# Patient Record
Sex: FEMALE | Race: WHITE | NOT HISPANIC OR LATINO | URBAN - NONMETROPOLITAN AREA
[De-identification: names, ages, dates, MRNs, and addresses within clinical notes are randomized per-mention and may not be internally consistent; named-entity substitution may affect disease eponyms.]

---

## 2021-08-29 PROBLEM — R00.2 PALPITATIONS: Status: ACTIVE | Noted: 2021-08-29

## 2021-08-29 PROBLEM — R05.9 COUGH: Status: ACTIVE | Noted: 2021-08-29

## 2021-08-29 PROBLEM — R06.09 DYSPNEA ON EXERTION: Status: ACTIVE | Noted: 2021-08-29

## 2022-04-10 PROBLEM — R42 VERTIGO: Status: ACTIVE | Noted: 2022-04-10

## 2023-04-18 ENCOUNTER — OFFICE VISIT (OUTPATIENT)
Dept: CARDIOLOGY | Facility: CLINIC | Age: 43
End: 2023-04-18
Payer: COMMERCIAL

## 2023-04-18 VITALS
DIASTOLIC BLOOD PRESSURE: 60 MMHG | WEIGHT: 172 LBS | BODY MASS INDEX: 27.64 KG/M2 | SYSTOLIC BLOOD PRESSURE: 102 MMHG | OXYGEN SATURATION: 98 % | HEART RATE: 60 BPM | HEIGHT: 66 IN

## 2023-04-18 DIAGNOSIS — R00.2 PALPITATIONS: Primary | ICD-10-CM

## 2023-04-18 DIAGNOSIS — R42 VERTIGO: ICD-10-CM

## 2023-04-18 RX ORDER — METOPROLOL SUCCINATE 25 MG/1
25 TABLET, EXTENDED RELEASE ORAL DAILY
Qty: 90 TABLET | Refills: 3 | Status: SHIPPED | OUTPATIENT
Start: 2023-04-18

## 2023-04-18 NOTE — PROGRESS NOTES
"     Subjective:     Encounter Date:04/18/2023      Patient ID: Estela Roberts is a 42 y.o. female.    Chief Complaint:  History of Present Illness    The patient presents today for annual followup.    The patient reports that she has done well over the past 1 year. She notes rare \"flutters,\" which are not associated with any particular activity. She takes Toprol-XL, which is effective for her. The patient denies chest pain, dyspnea, and syncope.    Her dizziness and lightheadedness resolved. She was prescribed as-needed Antivert, which she has not required for a lengthy period of time.    She changed jobs, which is has reduced stress.        Review of Systems   Constitutional: Negative for diaphoresis, fever and malaise/fatigue.   HENT: Negative for congestion.    Eyes: Negative for vision loss in left eye and vision loss in right eye.   Cardiovascular: Positive for palpitations. Negative for chest pain, claudication, dyspnea on exertion, irregular heartbeat, leg swelling, orthopnea and syncope.   Respiratory: Negative for cough, shortness of breath and wheezing.    Hematologic/Lymphatic: Negative for adenopathy.   Skin: Negative for rash.   Musculoskeletal: Negative for joint pain and joint swelling.   Gastrointestinal: Negative for abdominal pain, diarrhea, nausea and vomiting.   Neurological: Negative for excessive daytime sleepiness, dizziness, focal weakness, light-headedness, numbness and weakness.   Psychiatric/Behavioral: Negative for depression. The patient does not have insomnia.      All negative except as noted in the HPI.      Current Outpatient Medications:   •  lamoTRIgine (LaMICtal) 200 MG tablet, Take 1 tablet by mouth 2 (Two) Times a Day., Disp: , Rfl:   •  metoprolol succinate XL (TOPROL-XL) 25 MG 24 hr tablet, Take 1 tablet by mouth Daily., Disp: 90 tablet, Rfl: 3       Objective:      Vitals:    04/18/23 0939   BP: 102/60   Pulse: 60   SpO2: 98%     Vitals and nursing note reviewed. "   Constitutional:       Appearance: Normal and healthy appearance. Well-developed and not in distress.   Eyes:      Extraocular Movements: Extraocular movements intact.      Pupils: Pupils are equal, round, and reactive to light.   HENT:      Head: Normocephalic and atraumatic.    Mouth/Throat:      Pharynx: Oropharynx is clear.   Neck:      Vascular: JVD normal.      Trachea: Trachea normal.   Pulmonary:      Effort: Pulmonary effort is normal.      Breath sounds: Normal breath sounds. No wheezing. No rhonchi. No rales.   Cardiovascular:      PMI at left midclavicular line. Normal rate. Regular rhythm. Normal S1. Normal S2.      Murmurs: There is no murmur.      No gallop. No rub.   Pulses:     Dorsalis pedis: 2+ bilaterally.     Posterior tibial: 2+ bilaterally.  Abdominal:      General: Bowel sounds are normal.      Palpations: Abdomen is soft.      Tenderness: There is no abdominal tenderness.   Musculoskeletal: Normal range of motion.      Cervical back: Normal range of motion and neck supple. Skin:     General: Skin is warm and dry.      Capillary Refill: Capillary refill takes less than 2 seconds.   Feet:      Right foot:      Skin integrity: Skin integrity normal.      Left foot:      Skin integrity: Skin integrity normal.   Neurological:      Mental Status: Alert and oriented to person, place and time.      Cranial Nerves: Cranial nerves are intact.      Sensory: Sensation is intact.      Motor: Motor function is intact.      Coordination: Coordination is intact.   Psychiatric:         Speech: Speech normal.         Behavior: Behavior is cooperative.         Lab Review:         ECG 12 Lead    Date/Time: 4/24/2023 9:38 AM  Performed by: López Hurst DO  Authorized by: López Hurst DO   Comparison: compared with previous ECG   Similar to previous ECG  Rhythm: sinus rhythm  Rate: normal  Conduction: conduction normal  QRS axis: normal  Other findings: T wave abnormality    Clinical  impression: abnormal EKG              Assessment/Plan:     Problem List Items Addressed This Visit        Cardiac and Vasculature    Palpitations - Primary       ENT    Vertigo     1. Palpitations    Plan  Toprol-XL was refilled. The patient will follow up in 1 year or sooner as needed for new or worsening symptoms.    Recommendations/plans:    Transcribed from ambient dictation for López Hurst DO by Liz Ferreira.  04/18/23   12:43 CDT    Patient or patient representative verbalized consent to the visit recording.  I have personally performed the services described in this document as transcribed by the above individual, and it is both accurate and complete.  López Hurst DO  4/24/2023  09:38 CDT

## 2024-03-15 RX ORDER — METOPROLOL SUCCINATE 25 MG/1
25 TABLET, EXTENDED RELEASE ORAL DAILY
Qty: 90 TABLET | Refills: 0 | Status: SHIPPED | OUTPATIENT
Start: 2024-03-15

## 2024-06-26 ENCOUNTER — OFFICE VISIT (OUTPATIENT)
Dept: CARDIOLOGY | Facility: CLINIC | Age: 44
End: 2024-06-26
Payer: COMMERCIAL

## 2024-06-26 VITALS
BODY MASS INDEX: 25.99 KG/M2 | DIASTOLIC BLOOD PRESSURE: 70 MMHG | HEART RATE: 52 BPM | SYSTOLIC BLOOD PRESSURE: 115 MMHG | OXYGEN SATURATION: 98 % | WEIGHT: 156 LBS | HEIGHT: 65 IN | RESPIRATION RATE: 18 BRPM

## 2024-06-26 DIAGNOSIS — R00.2 PALPITATIONS: Primary | ICD-10-CM

## 2024-06-26 DIAGNOSIS — R00.1 BRADYCARDIA, SINUS: ICD-10-CM

## 2024-06-26 PROCEDURE — 93000 ELECTROCARDIOGRAM COMPLETE: CPT | Performed by: NURSE PRACTITIONER

## 2024-06-26 PROCEDURE — 99214 OFFICE O/P EST MOD 30 MIN: CPT | Performed by: NURSE PRACTITIONER

## 2024-06-26 RX ORDER — TOPIRAMATE 25 MG/1
1 TABLET ORAL
COMMUNITY

## 2024-06-26 NOTE — PROGRESS NOTES
Subjective:     Encounter Date:06/26/2024      Patient ID: Estela Roberts is a 43 y.o. female     Chief Complaint:  History of Present Illness  Patient presents today for routine cardiology follow up. Patient is followed for palpitations. Patient was noted to have rare PACs and PVCs. She is on Toprol. She has GERD, Palpitations and Depression. She follows with Delores GORE as her PCP. She notes that her palpitations were occurring during stress at work. She notes she no longer works in this stressful situation. She notes her palpitations have resolved. She is asking if she has to stay on Toprol.     The following portions of the patient's history were reviewed and updated as appropriate: allergies, current medications, past medical history, past social history, past and problem list.    Allergies   Allergen Reactions    Demerol [Meperidine] Unknown - High Severity       Current Outpatient Medications:     lamoTRIgine (LaMICtal) 200 MG tablet, Take 1 tablet by mouth 2 (Two) Times a Day., Disp: , Rfl:     metoprolol succinate XL (TOPROL-XL) 25 MG 24 hr tablet, Take 1 tablet by mouth Daily., Disp: 90 tablet, Rfl: 0    topiramate (TOPAMAX) 25 MG tablet, Take 1 tablet by mouth every night at bedtime., Disp: , Rfl:     Social History     Socioeconomic History    Marital status:    Tobacco Use    Smoking status: Former     Types: Cigarettes    Smokeless tobacco: Never   Vaping Use    Vaping status: Never Used   Substance and Sexual Activity    Alcohol use: Yes    Drug use: Never    Sexual activity: Defer       Review of Systems   Constitutional: Negative for chills, decreased appetite, fever, malaise/fatigue, weight gain and weight loss.   HENT:  Negative for nosebleeds.    Eyes:  Negative for visual disturbance.   Cardiovascular:  Negative for chest pain, dyspnea on exertion, leg swelling, near-syncope, orthopnea, palpitations, paroxysmal nocturnal dyspnea and syncope.   Respiratory:  Negative for cough,  hemoptysis, shortness of breath and snoring.    Endocrine: Negative for cold intolerance and heat intolerance.   Hematologic/Lymphatic: Negative for bleeding problem. Does not bruise/bleed easily.   Skin:  Negative for rash.   Musculoskeletal:  Negative for back pain and falls.   Gastrointestinal:  Negative for abdominal pain, constipation, diarrhea, heartburn, melena, nausea and vomiting.   Genitourinary:  Negative for hematuria.   Neurological:  Negative for dizziness, headaches and light-headedness.   Psychiatric/Behavioral:  Negative for altered mental status.    Allergic/Immunologic: Negative for persistent infections.              Objective:     Constitutional:       Appearance: Healthy appearance. Not in distress.   Eyes:      Pupils: Pupils are equal, round, and reactive to light.   HENT:      Head: Normocephalic and atraumatic.      Nose: Nose normal.    Mouth/Throat:      Dentition: Normal.   Pulmonary:      Effort: Pulmonary effort is normal.      Breath sounds: Normal breath sounds.   Chest:      Chest wall: Not tender to palpatation.   Cardiovascular:      PMI at left midclavicular line. Normal rate. Regular rhythm.      Murmurs: There is no murmur.   Pulses:     Intact distal pulses.   Edema:     Peripheral edema absent.   Abdominal:      General: Bowel sounds are normal.      Palpations: Abdomen is soft.   Musculoskeletal: Normal range of motion.      Cervical back: Normal range of motion. Skin:     General: Skin is warm and dry.   Neurological:      Mental Status: Alert, oriented to person, place, and time and oriented to person, place and time.   Psychiatric:         Attention and Perception: Attention normal.         Mood and Affect: Mood normal.             ECG 12 Lead    Date/Time: 6/26/2024 10:36 AM  Performed by: Sukhjinder Suarez APRN    Authorized by: Sukhjinder Suarez APRN  Comparison: compared with previous ECG from 4/18/2023  Similar to previous ECG  Comparison to previous ECG: Rates have  "decreased  Rhythm: sinus bradycardia        /70 (BP Location: Right arm, Patient Position: Sitting, Cuff Size: Adult)   Pulse 52   Resp 18   Ht 165.1 cm (65\")   Wt 70.8 kg (156 lb)   SpO2 98%   BMI 25.96 kg/m²   Lab Review:   I have reviewed       Lab Results   Component Value Date    TRIG 94 05/10/2023    HDL 77 05/10/2023     (H) 05/10/2023      Results for orders placed during the hospital encounter of 10/05/21    Adult Transthoracic Echo Complete W/ Cont if Necessary Per Protocol    Interpretation Summary  · Left ventricular ejection fraction appears to be 61 - 65%. Left ventricular systolic function is normal.  · Left ventricular diastolic function was normal.  · Normal right ventricular cavity size and systolic function noted.  · No evidence of pulmonary hypertension is present.  · No hemodynamically significant valvular disease.     Assessment:          Diagnosis Plan   1. Palpitations        2. Bradycardia, sinus               Plan:       Palpitations- have resolved. She feels this were secondary to stress and she has been removed from stressful situation (work). She wishes to try to wean off Toprol. She will reduce to half a tablet for 1 month. If no change in palpitations she will stop. She will monitor symptoms closely.   Sinus bradycardia- asymptomatic.     Follow up in 1 year.          "

## 2024-09-11 RX ORDER — METOPROLOL SUCCINATE 25 MG/1
25 TABLET, EXTENDED RELEASE ORAL DAILY
Refills: 0 | OUTPATIENT
Start: 2024-09-11

## 2024-09-11 NOTE — TELEPHONE ENCOUNTER
Patient is trying to wean herself off metoprolol. She report shaving quite a few tablets left. She continues taking 1/2 tablet.She does not want any refills at this time